# Patient Record
Sex: MALE | Race: WHITE | NOT HISPANIC OR LATINO | URBAN - METROPOLITAN AREA
[De-identification: names, ages, dates, MRNs, and addresses within clinical notes are randomized per-mention and may not be internally consistent; named-entity substitution may affect disease eponyms.]

---

## 2017-02-27 ENCOUNTER — OFFICE VISIT - HEALTHEAST (OUTPATIENT)
Dept: ALLERGY | Facility: CLINIC | Age: 32
End: 2017-02-27

## 2017-02-27 DIAGNOSIS — L50.9 URTICARIA: ICD-10-CM

## 2017-02-27 ASSESSMENT — MIFFLIN-ST. JEOR: SCORE: 1911.94

## 2017-03-01 ENCOUNTER — COMMUNICATION - HEALTHEAST (OUTPATIENT)
Dept: ALLERGY | Facility: CLINIC | Age: 32
End: 2017-03-01

## 2021-05-25 ENCOUNTER — RECORDS - HEALTHEAST (OUTPATIENT)
Dept: ADMINISTRATIVE | Facility: CLINIC | Age: 36
End: 2021-05-25

## 2021-05-27 ENCOUNTER — RECORDS - HEALTHEAST (OUTPATIENT)
Dept: ADMINISTRATIVE | Facility: CLINIC | Age: 36
End: 2021-05-27

## 2021-05-30 VITALS — HEIGHT: 74 IN | WEIGHT: 200 LBS | BODY MASS INDEX: 25.67 KG/M2

## 2021-06-09 NOTE — PROGRESS NOTES
Assessment:     Recurrent urticaria and angioedema.  Consider possible anaphylaxis however no hypotension seen.  No clear allergy trigger.  Consider possible mastocytosis.  Consider food allergy.  Given initial presentation, I believe this is likely chronic idiopathic urticaria.   hyperventilation with recent episode.    Plan:   Cetirizine 10 mg a.m. and p.m. for the next one month.  Think that to once daily for 1 week and then stop   Benadryl 1-2 tablets every 6 hours on an as-needed basis.     Discussed common triggering factors such as heat, Nsaids, pressure, alcohol, stress etc...    We'll do specific IgE for pepper and serum tryptase  Letter for tryptase level to be drawn at emergency room.  Record any foods eaten before new episodes.  EpiPen to be carried    Return in 4 weeks.  ____________________________________________________________________________                                                             Mark is here with concern of recurrent allergic reactions.  Initially this started in November 2016.  He was having periodic hives.  He describes a rash that is red and raised, blotchy and itchy.  No bruising.  The rash was diffuse.  Benadryl and ice seem to help.  His symptoms seem to be worse overnight.  At the very onset he was ill and had some nausea.  Then, December 4 he developed a sensation of throat swelling.  He went to the emergency room where epinephrine was given.  He was also given a prescription for prednisone.  Rash and swelling went away.  He does recall having diarrhea at the onset of these symptoms.  He also had some lip and ear swelling.  Prednisone was for approximately 8 days and then discontinued.  Patient reports he had no symptoms of hives or swelling until yesterday.  At approximate 4:00 PM his hands became itchy.  He developed diffuse itching.  He was on his way home from work when he felt nauseous.  He felt lightheaded.  He stopped the car called 911.  He recalls passing out  as he stood up.  His symptoms are improved when EMS arrived.  He is taking the emergency room where epinephrine was given he noted lip and ear swelling.  There was no documented hypotension in the emergency room.  I reviewed the ambulance record and there was no measurable blood pressure noted.  He also had stomach upset but no diarrhea.  No vomiting.  No wheezing.  For these episodes there's been no clear trigger.  There's been no specific food association.  He does not take daily medications.  He recalls no NSAIDs.  On his most recent episode he recalls eating lunch around 1:30 PM.  He had a chicken sandwich with pepper Jack cheese, reyez, french fries and Sprite.  Since then he has eaten chicken and wheat and milk without any immediate symptoms.  No clear stress association although he did start his new job as a  this past summer.  No new illnesses.  No physical triggers.    Review of symptoms:  as above otherwise negative.    Past medical history: No other chronic medical conditions noted.    Allergies: No known allergies to medications, latex , foods or hymenoptera venom.    Family History:  No known member of the family with allergy or asthma.    Social history: Currently has lived in the same house for 10 years.  It has forced air heat and central air conditioning.  There is a basement present.  He's had a dog in the home for 7 years.  No cigarette smoking history.    Medications: Reviewed in chart.  EpiPen prescription given    Physical Exam:  General:  Alert and oriented.  Eyes:  Sclera clear.  Ears: TMs translucent grey with bony landmarks visible. Nose: Pale, boggy mucosal membranes.  Throat: Pink, mosit.  No lesions.  Neck: Supple.  No lymphadenopathy.  Lungs: CTA.  CV: Regular rate and rhythm. Extremities: Well perfused.  No clubbing or cyanosis. Skin: No rash.    Emergency room records reviewed  45 min spent in direct contact with the patient.  More than 50% in counseling and coordination  of care.  This transcription uses voice recognition software, which may contain typographical errors.